# Patient Record
(demographics unavailable — no encounter records)

---

## 2025-01-31 NOTE — HISTORY OF PRESENT ILLNESS
[0 - No Distress] : Distress Level: 0 [90: Able to carry normal activity; minor signs or symptoms of disease.] : 90: Able to carry normal activity; minor signs or symptoms of disease.  [de-identified] : 55 yo gentleman with PMHx of  HTN and DM referred for evaluation and management of paraproteinemia.\par  \par  Patient was in his usual state of health until a recent annual check up when he was found to have elevated calcium level  10.9g/dl, denies bone pain, denies fevers, night sweats, positive for  weight loss ( intentional weight loss approx  80 pounds in about one year). \par  \par  3/6/2020- T protein 8.9, M spike 0.8 g/dl, SUKHDEEP IgG lambda band identified. Calcium 10.9 mg/dl. \par  \par  5/7/2020- Bone marrow biopsy and bone marrow aspirate trilineage hematopoiesis with maturation, and slightly increased iron stores. Small subsets of monotypic plasma cells and polytypic plasma cells are present by flow cytometry with 5% plasma cells on bone marrow aspirate. C/W MGUS. \par  \par  Skeletal survey was negative for lytic lesions.\par   [de-identified] : Patient presents for follow up. Reports feeling well, no new complaints. Denies fevers, chills, night sweats, bone pain and unintentional weight loss.  1/9/24- M spike 1.4 g/dl. Kappa 1.91, lambda 3.24, ratio 0.59   No other changes in medical, surgical or social history since  1/2024.

## 2025-01-31 NOTE — ASSESSMENT
[FreeTextEntry1] : 60 yo male with PMHx of HTN, DM and MGUS. Presents for follow up.  3/6/2020- T protein 8.9, M spike 0.8 g/dl, SUKHDEEP IgG lambda band identified. Calcium 10.9 mg/dl.  5/7/2020- Bone marrow biopsy and bone marrow aspirate trilineage hematopoiesis with maturation, and slightly increased iron stores. Small subsets of monotypic plasma cells and polytypic plasma cells are present by flow cytometry with 5% plasma cells on bone marrow aspirate. C/W MGUS. MM FISH was normal.  Skeletal survey was negative for lytic lesions.  1/9/24- M spike 1.4 g/dl. Kappa 1.91, lambda 3.24, ratio 0.59  Counts stable.  Diabetes: controlled with diet.  HTN: Off treatment too. Intentional weight lost 80 pounds/ 2 years.  Greater than 50% of the encounter time was spent on counseling and coordination of care for  MGUS     and I have spent  30   minutes of face to face time with the patient.  RTC in 12 months.

## 2025-01-31 NOTE — HISTORY OF PRESENT ILLNESS
[0 - No Distress] : Distress Level: 0 [90: Able to carry normal activity; minor signs or symptoms of disease.] : 90: Able to carry normal activity; minor signs or symptoms of disease.  [de-identified] : 55 yo gentleman with PMHx of  HTN and DM referred for evaluation and management of paraproteinemia.\par  \par  Patient was in his usual state of health until a recent annual check up when he was found to have elevated calcium level  10.9g/dl, denies bone pain, denies fevers, night sweats, positive for  weight loss ( intentional weight loss approx  80 pounds in about one year). \par  \par  3/6/2020- T protein 8.9, M spike 0.8 g/dl, SUKHDEEP IgG lambda band identified. Calcium 10.9 mg/dl. \par  \par  5/7/2020- Bone marrow biopsy and bone marrow aspirate trilineage hematopoiesis with maturation, and slightly increased iron stores. Small subsets of monotypic plasma cells and polytypic plasma cells are present by flow cytometry with 5% plasma cells on bone marrow aspirate. C/W MGUS. \par  \par  Skeletal survey was negative for lytic lesions.\par   [de-identified] : Patient presents for follow up. Reports feeling well, no new complaints. Denies fevers, chills, night sweats, bone pain and unintentional weight loss.  1/9/24- M spike 1.4 g/dl. Kappa 1.91, lambda 3.24, ratio 0.59   No other changes in medical, surgical or social history since  1/2024.

## 2025-01-31 NOTE — ASSESSMENT
[FreeTextEntry1] : 58 yo male with PMHx of HTN, DM and MGUS. Presents for follow up.  3/6/2020- T protein 8.9, M spike 0.8 g/dl, SUKHDEEP IgG lambda band identified. Calcium 10.9 mg/dl.  5/7/2020- Bone marrow biopsy and bone marrow aspirate trilineage hematopoiesis with maturation, and slightly increased iron stores. Small subsets of monotypic plasma cells and polytypic plasma cells are present by flow cytometry with 5% plasma cells on bone marrow aspirate. C/W MGUS. MM FISH was normal.  Skeletal survey was negative for lytic lesions.  1/9/24- M spike 1.4 g/dl. Kappa 1.91, lambda 3.24, ratio 0.59  Counts stable.  Diabetes: controlled with diet.  HTN: Off treatment too. Intentional weight lost 80 pounds/ 2 years.  Greater than 50% of the encounter time was spent on counseling and coordination of care for  MGUS     and I have spent  30   minutes of face to face time with the patient.  RTC in 12 months.

## 2025-02-14 NOTE — HEALTH RISK ASSESSMENT
[Never (0 pts)] : Never (0 points) [No] : In the past 12 months have you used drugs other than those required for medical reasons? No [0] : 2) Feeling down, depressed, or hopeless: Not at all (0) [PHQ-2 Negative - No further assessment needed] : PHQ-2 Negative - No further assessment needed [Patient reported colonoscopy was abnormal] : Patient reported colonoscopy was abnormal [HIV test declined] : HIV test declined [Hepatitis C test declined] : Hepatitis C test declined [With Patient/Caregiver] : , with patient/caregiver [Designated Healthcare Proxy] : Designated healthcare proxy [Relationship: ___] : Relationship: [unfilled] [Former] : Former [< 15 Years] : < 15 Years [NO] : No [Audit-CScore] : 0 [[id number]] : 0 [Reports changes in hearing] : Reports no changes in hearing [ColonoscopyDate] : 10/22 [ColonoscopyComments] : polyp, diverticuli.  rpt 3 yr [de-identified] : seen oph  2/2025-  [de-identified] : seen dental 3 mo ago [AdvancecareDate] : 02/25

## 2025-02-14 NOTE — PHYSICAL EXAM
[No Acute Distress] : no acute distress [Well Nourished] : well nourished [Well Developed] : well developed [Well-Appearing] : well-appearing [Normal Sclera/Conjunctiva] : normal sclera/conjunctiva [Normal TMs] : both tympanic membranes were normal [No Lymphadenopathy] : no lymphadenopathy [Supple] : supple [No Respiratory Distress] : no respiratory distress  [No Accessory Muscle Use] : no accessory muscle use [Clear to Auscultation] : lungs were clear to auscultation bilaterally [Normal Rate] : normal rate  [Regular Rhythm] : with a regular rhythm [Normal S1, S2] : normal S1 and S2 [No Murmur] : no murmur heard [No Carotid Bruits] : no carotid bruits [No Edema] : there was no peripheral edema [Soft] : abdomen soft [Non Tender] : non-tender [Non-distended] : non-distended [No Masses] : no abdominal mass palpated [Normal Bowel Sounds] : normal bowel sounds [Grossly Normal Strength/Tone] : grossly normal strength/tone [No Focal Deficits] : no focal deficits [Normal Gait] : normal gait [Normal Affect] : the affect was normal [Normal Insight/Judgement] : insight and judgment were intact [Thyroid Normal, No Nodules] : the thyroid was normal and there were no nodules present

## 2025-02-14 NOTE — PLAN
[FreeTextEntry1] : EKG- NSR 72 pt 's father enrolled in study for Pancreatic Ca at [facility]- rec pt ck w them if there were any genetic markers.  HTN- low salt diet.   ck home BP - if elevated- move appt earlier

## 2025-02-14 NOTE — COUNSELING
[Cessation strategies including cessation program discussed] : Cessation strategies including cessation program discussed [Use of nicotine replacement therapies and other medications discussed] : Use of nicotine replacement therapies and other medications discussed [Encouraged to pick a quit date and identify support needed to quit] : Encouraged to pick a quit date and identify support needed to quit [Yes] : Willing to quit smoking [Potential consequences of obesity discussed] : Potential consequences of obesity discussed [Benefits of weight loss discussed] : Benefits of weight loss discussed [Encouraged to increase physical activity] : Encouraged to increase physical activity [Good understanding] : Patient has a good understanding of disease, goals and obesity follow-up plan [____ min/wk Activity] : [unfilled] min/wk activity

## 2025-02-14 NOTE — HISTORY OF PRESENT ILLNESS
[FreeTextEntry1] : CPE [de-identified] : vaccine- flu vac 10/2024 diet- vegan diet exercise  had flu 13 days ago- improving sty b/l eyes- improving.  using warm compress.  seen ophth reviewed 3/11/23 CT chest- stable renal cyst, non obst L renal stone. reviewed 1/31/25 labs- ALT 60, Protein 8.8 IGT-.  stopped eating vegan cheese, decr  candy, carbs. incr walking.   overweight-  exercising- 2-3 /wk HTN- lisinopril-   pt is compliant w meds,.  no CP or SOB.    chol- nl w diet/ exercise MGUS- reviewed 1/31/25 Heme notes  ED-reviewed 9/15/22 Urology notes- and 9/8/22 labs reviewed 1/12/24 thyroid US- thyroid nodule no smoking over the past 3 mo.  occasional smoked from 5/2024 - 11/2024 skin Ca screening- done over past yr

## 2025-05-09 NOTE — PLAN
Referral printed and faxed to the number below
[FreeTextEntry1] : DM- new onset- reviewed DM- disease, complications, diet, treatment and its SE,   also reviewed hypoglycemia- prevention, treatment and symptoms reviewed glucose monitoring .  pre and post meal DM ranges. start metformin chol- start rosuvastatin. had coffee w oat milk today. Blood was collected in the office.
[FreeTextEntry1] : DM- new onset- reviewed DM- disease, complications, diet, treatment and its SE,   also reviewed hypoglycemia- prevention, treatment and symptoms reviewed glucose monitoring .  pre and post meal DM ranges. start metformin chol- start rosuvastatin. had coffee w oat milk today. Blood was collected in the office.

## 2025-05-09 NOTE — HISTORY OF PRESENT ILLNESS
[FreeTextEntry1] : new DM [de-identified] :  diet- vegan diet reviewed 2/18/25 A1cd 7.4, , trig 277 reviewed 3/11/23 CT chest- stable renal cyst, non obst L renal stone.- rec rpt CT chest reviewed 1/31/25 labs- ALT 60, Protein 8.8 new onset DM-.  still eating candy, desserts, carbs.  overweight-  exercising- 4 /wk- 15 min.  HTN- lisinopril-   pt is compliant w meds,.  no CP or SOB.    chol- nl w diet/ exercise MGUS- reviewed 1/31/25 Heme notes  ED-reviewed 9/15/22 Urology notes- and 9/8/22 labs reviewed 1/12/24 thyroid US- thyroid nodule no smoking over the past 6 mo.  occasional smoked from 5/2024 - 11/2024 skin Ca screening- done over past yr

## 2025-05-09 NOTE — HISTORY OF PRESENT ILLNESS
[FreeTextEntry1] : new DM [de-identified] :  diet- vegan diet reviewed 2/18/25 A1cd 7.4, , trig 277 reviewed 3/11/23 CT chest- stable renal cyst, non obst L renal stone.- rec rpt CT chest reviewed 1/31/25 labs- ALT 60, Protein 8.8 new onset DM-.  still eating candy, desserts, carbs.  overweight-  exercising- 4 /wk- 15 min.  HTN- lisinopril-   pt is compliant w meds,.  no CP or SOB.    chol- nl w diet/ exercise MGUS- reviewed 1/31/25 Heme notes  ED-reviewed 9/15/22 Urology notes- and 9/8/22 labs reviewed 1/12/24 thyroid US- thyroid nodule no smoking over the past 6 mo.  occasional smoked from 5/2024 - 11/2024 skin Ca screening- done over past yr

## 2025-06-12 NOTE — ASSESSMENT
[FreeTextEntry1] : 58 yo male with PMHx of HTN, DM and MGUS. Presents for follow up.  3/6/2020- T protein 8.9, M spike 0.8 g/dl, SUKHDEEP IgG lambda band identified. Calcium 10.9 mg/dl.  5/7/2020- Bone marrow biopsy and bone marrow aspirate trilineage hematopoiesis with maturation, and slightly increased iron stores. Small subsets of monotypic plasma cells and polytypic plasma cells are present by flow cytometry with 5% plasma cells on bone marrow aspirate. C/W MGUS. MM FISH was normal.  Skeletal survey was negative for lytic lesions.  2/7/25- M spike 1.7 g/dl. Kappa 2.04, lambda 3.55, ratio 0.57   Patient had a procedure done by ophthalmologist, complicated by bleeding. Dr Acuña treated patient with TXA. Bleeding stopped. Will do a bleeding diathesis work up.   Diabetes: controlled with diet.  HTN: Off treatment too. Intentional weight lost 80 pounds/ 2 years.  Greater than 50% of the encounter time was spent on counseling and coordination of care for  MGUS     and I have spent  40   minutes of face to face time with the patient.  RTC in 12 months.

## 2025-06-12 NOTE — HISTORY OF PRESENT ILLNESS
[0 - No Distress] : Distress Level: 0 [90: Able to carry normal activity; minor signs or symptoms of disease.] : 90: Able to carry normal activity; minor signs or symptoms of disease.  [de-identified] : 55 yo gentleman with PMHx of  HTN and DM referred for evaluation and management of paraproteinemia.\par  \par  Patient was in his usual state of health until a recent annual check up when he was found to have elevated calcium level  10.9g/dl, denies bone pain, denies fevers, night sweats, positive for  weight loss ( intentional weight loss approx  80 pounds in about one year). \par  \par  3/6/2020- T protein 8.9, M spike 0.8 g/dl, SUKHDEEP IgG lambda band identified. Calcium 10.9 mg/dl. \par  \par  5/7/2020- Bone marrow biopsy and bone marrow aspirate trilineage hematopoiesis with maturation, and slightly increased iron stores. Small subsets of monotypic plasma cells and polytypic plasma cells are present by flow cytometry with 5% plasma cells on bone marrow aspirate. C/W MGUS. \par  \par  Skeletal survey was negative for lytic lesions.\par   [de-identified] : Patient presents for follow up. Denies fevers, chills, night sweats, bone pain and unintentional weight loss. Patient had a procedure done by ophthalmologist, complicated by bleeding. Dr Acuña treated patient with TXA. Bleeding stopped.   2/7/25- M spike 1.7 g/dl. Kappa 2.04, lambda 3.55, ratio 0.57   No other changes in medical, surgical or social history since  1/31/2025.